# Patient Record
Sex: FEMALE | Race: ASIAN | NOT HISPANIC OR LATINO
[De-identification: names, ages, dates, MRNs, and addresses within clinical notes are randomized per-mention and may not be internally consistent; named-entity substitution may affect disease eponyms.]

---

## 2019-12-30 PROBLEM — Z00.00 ENCOUNTER FOR PREVENTIVE HEALTH EXAMINATION: Status: ACTIVE | Noted: 2019-12-30

## 2020-01-07 ENCOUNTER — APPOINTMENT (OUTPATIENT)
Dept: OBGYN | Facility: CLINIC | Age: 65
End: 2020-01-07
Payer: MEDICAID

## 2020-01-07 VITALS
HEART RATE: 70 BPM | HEIGHT: 64 IN | TEMPERATURE: 97.9 F | SYSTOLIC BLOOD PRESSURE: 133 MMHG | DIASTOLIC BLOOD PRESSURE: 70 MMHG | BODY MASS INDEX: 32.1 KG/M2 | WEIGHT: 188 LBS | OXYGEN SATURATION: 98 %

## 2020-01-07 DIAGNOSIS — Z01.419 ENCOUNTER FOR GYNECOLOGICAL EXAMINATION (GENERAL) (ROUTINE) W/OUT ABNORMAL FINDINGS: ICD-10-CM

## 2020-01-07 DIAGNOSIS — Z78.9 OTHER SPECIFIED HEALTH STATUS: ICD-10-CM

## 2020-01-07 PROCEDURE — 99386 PREV VISIT NEW AGE 40-64: CPT

## 2020-01-07 RX ORDER — GABAPENTIN 300 MG/1
300 CAPSULE ORAL
Qty: 60 | Refills: 0 | Status: ACTIVE | COMMUNITY
Start: 2019-12-04

## 2020-01-07 RX ORDER — LANCETS 33 GAUGE
EACH MISCELLANEOUS
Qty: 100 | Refills: 0 | Status: ACTIVE | COMMUNITY
Start: 2019-12-04

## 2020-01-07 RX ORDER — ATORVASTATIN CALCIUM 20 MG/1
20 TABLET, FILM COATED ORAL
Qty: 30 | Refills: 0 | Status: ACTIVE | COMMUNITY
Start: 2019-12-04

## 2020-01-07 RX ORDER — TOLTERODINE TARTRATE 2 MG/1
2 CAPSULE, EXTENDED RELEASE ORAL
Qty: 30 | Refills: 0 | Status: DISCONTINUED | COMMUNITY
Start: 2019-12-04

## 2020-01-07 RX ORDER — METFORMIN HYDROCHLORIDE 500 MG/1
500 TABLET, COATED ORAL
Qty: 60 | Refills: 0 | Status: ACTIVE | COMMUNITY
Start: 2019-12-04

## 2020-01-07 RX ORDER — ISOPROPYL ALCOHOL 0.75 G/1
SWAB TOPICAL
Qty: 100 | Refills: 0 | Status: ACTIVE | COMMUNITY
Start: 2019-12-04

## 2020-01-07 RX ORDER — OXYBUTYNIN CHLORIDE 5 MG/1
5 TABLET, EXTENDED RELEASE ORAL
Qty: 30 | Refills: 0 | Status: ACTIVE | COMMUNITY
Start: 2019-12-04

## 2020-01-07 RX ORDER — ASPIRIN 81 MG/1
81 TABLET, COATED ORAL
Qty: 30 | Refills: 0 | Status: ACTIVE | COMMUNITY
Start: 2019-12-04

## 2020-01-07 RX ORDER — LOSARTAN POTASSIUM 50 MG/1
50 TABLET, FILM COATED ORAL
Qty: 30 | Refills: 0 | Status: ACTIVE | COMMUNITY
Start: 2019-12-04

## 2020-01-07 RX ORDER — OMEPRAZOLE 20 MG/1
20 TABLET, DELAYED RELEASE ORAL
Qty: 28 | Refills: 0 | Status: ACTIVE | COMMUNITY
Start: 2019-12-04

## 2020-01-07 RX ORDER — KETOROLAC TROMETHAMINE 5 MG/ML
0.5 SOLUTION OPHTHALMIC
Qty: 5 | Refills: 0 | Status: ACTIVE | COMMUNITY
Start: 2019-12-20

## 2020-01-07 NOTE — CHIEF COMPLAINT
[Initial Visit] : initial GYN visit [FreeTextEntry1] : came c/o sudden numbness at her vulva twice&went away  1week back

## 2020-10-18 ENCOUNTER — EMERGENCY (EMERGENCY)
Facility: HOSPITAL | Age: 65
LOS: 1 days | Discharge: HOME | End: 2020-10-18
Attending: STUDENT IN AN ORGANIZED HEALTH CARE EDUCATION/TRAINING PROGRAM
Payer: MEDICAID

## 2020-10-18 VITALS
SYSTOLIC BLOOD PRESSURE: 132 MMHG | RESPIRATION RATE: 18 BRPM | TEMPERATURE: 98 F | OXYGEN SATURATION: 98 % | HEIGHT: 63 IN | WEIGHT: 179.9 LBS | HEART RATE: 74 BPM | DIASTOLIC BLOOD PRESSURE: 67 MMHG

## 2020-10-18 LAB
ACETONE SERPL-MCNC: NEGATIVE — SIGNIFICANT CHANGE UP
ALBUMIN SERPL ELPH-MCNC: 3.4 G/DL — LOW (ref 3.5–5)
ALP SERPL-CCNC: 100 U/L — SIGNIFICANT CHANGE UP (ref 40–120)
ALT FLD-CCNC: 23 U/L DA — SIGNIFICANT CHANGE UP (ref 10–60)
ANION GAP SERPL CALC-SCNC: 5 MMOL/L — SIGNIFICANT CHANGE UP (ref 5–17)
APPEARANCE UR: CLEAR — SIGNIFICANT CHANGE UP
AST SERPL-CCNC: 17 U/L — SIGNIFICANT CHANGE UP (ref 10–40)
BASOPHILS # BLD AUTO: 0.03 K/UL — SIGNIFICANT CHANGE UP (ref 0–0.2)
BASOPHILS NFR BLD AUTO: 0.2 % — SIGNIFICANT CHANGE UP (ref 0–2)
BILIRUB SERPL-MCNC: 0.3 MG/DL — SIGNIFICANT CHANGE UP (ref 0.2–1.2)
BILIRUB UR-MCNC: NEGATIVE — SIGNIFICANT CHANGE UP
BUN SERPL-MCNC: 13 MG/DL — SIGNIFICANT CHANGE UP (ref 7–18)
CALCIUM SERPL-MCNC: 8.8 MG/DL — SIGNIFICANT CHANGE UP (ref 8.4–10.5)
CHLORIDE SERPL-SCNC: 106 MMOL/L — SIGNIFICANT CHANGE UP (ref 96–108)
CO2 SERPL-SCNC: 28 MMOL/L — SIGNIFICANT CHANGE UP (ref 22–31)
COLOR SPEC: YELLOW — SIGNIFICANT CHANGE UP
CREAT SERPL-MCNC: 0.73 MG/DL — SIGNIFICANT CHANGE UP (ref 0.5–1.3)
DIFF PNL FLD: NEGATIVE — SIGNIFICANT CHANGE UP
EOSINOPHIL # BLD AUTO: 0.03 K/UL — SIGNIFICANT CHANGE UP (ref 0–0.5)
EOSINOPHIL NFR BLD AUTO: 0.2 % — SIGNIFICANT CHANGE UP (ref 0–6)
GLUCOSE SERPL-MCNC: 154 MG/DL — HIGH (ref 70–99)
GLUCOSE UR QL: NEGATIVE — SIGNIFICANT CHANGE UP
HCT VFR BLD CALC: 34.2 % — LOW (ref 34.5–45)
HGB BLD-MCNC: 10.8 G/DL — LOW (ref 11.5–15.5)
IMM GRANULOCYTES NFR BLD AUTO: 0.3 % — SIGNIFICANT CHANGE UP (ref 0–1.5)
KETONES UR-MCNC: NEGATIVE — SIGNIFICANT CHANGE UP
LEUKOCYTE ESTERASE UR-ACNC: NEGATIVE — SIGNIFICANT CHANGE UP
LYMPHOCYTES # BLD AUTO: 1.05 K/UL — SIGNIFICANT CHANGE UP (ref 1–3.3)
LYMPHOCYTES # BLD AUTO: 6.9 % — LOW (ref 13–44)
MAGNESIUM SERPL-MCNC: 1.9 MG/DL — SIGNIFICANT CHANGE UP (ref 1.6–2.6)
MCHC RBC-ENTMCNC: 26.6 PG — LOW (ref 27–34)
MCHC RBC-ENTMCNC: 31.6 GM/DL — LOW (ref 32–36)
MCV RBC AUTO: 84.2 FL — SIGNIFICANT CHANGE UP (ref 80–100)
MONOCYTES # BLD AUTO: 0.96 K/UL — HIGH (ref 0–0.9)
MONOCYTES NFR BLD AUTO: 6.3 % — SIGNIFICANT CHANGE UP (ref 2–14)
NEUTROPHILS # BLD AUTO: 13.05 K/UL — HIGH (ref 1.8–7.4)
NEUTROPHILS NFR BLD AUTO: 86.1 % — HIGH (ref 43–77)
NITRITE UR-MCNC: NEGATIVE — SIGNIFICANT CHANGE UP
NRBC # BLD: 0 /100 WBCS — SIGNIFICANT CHANGE UP (ref 0–0)
PH UR: 6 — SIGNIFICANT CHANGE UP (ref 5–8)
PLATELET # BLD AUTO: 233 K/UL — SIGNIFICANT CHANGE UP (ref 150–400)
POTASSIUM SERPL-MCNC: 4.4 MMOL/L — SIGNIFICANT CHANGE UP (ref 3.5–5.3)
POTASSIUM SERPL-SCNC: 4.4 MMOL/L — SIGNIFICANT CHANGE UP (ref 3.5–5.3)
PROT SERPL-MCNC: 7.3 G/DL — SIGNIFICANT CHANGE UP (ref 6–8.3)
PROT UR-MCNC: NEGATIVE — SIGNIFICANT CHANGE UP
RBC # BLD: 4.06 M/UL — SIGNIFICANT CHANGE UP (ref 3.8–5.2)
RBC # FLD: 15.9 % — HIGH (ref 10.3–14.5)
SODIUM SERPL-SCNC: 139 MMOL/L — SIGNIFICANT CHANGE UP (ref 135–145)
SP GR SPEC: 1.01 — SIGNIFICANT CHANGE UP (ref 1.01–1.02)
UROBILINOGEN FLD QL: NEGATIVE — SIGNIFICANT CHANGE UP
WBC # BLD: 15.16 K/UL — HIGH (ref 3.8–10.5)
WBC # FLD AUTO: 15.16 K/UL — HIGH (ref 3.8–10.5)

## 2020-10-18 PROCEDURE — 81001 URINALYSIS AUTO W/SCOPE: CPT

## 2020-10-18 PROCEDURE — 36415 COLL VENOUS BLD VENIPUNCTURE: CPT

## 2020-10-18 PROCEDURE — 83735 ASSAY OF MAGNESIUM: CPT

## 2020-10-18 PROCEDURE — 82009 KETONE BODYS QUAL: CPT

## 2020-10-18 PROCEDURE — 80053 COMPREHEN METABOLIC PANEL: CPT

## 2020-10-18 PROCEDURE — 99283 EMERGENCY DEPT VISIT LOW MDM: CPT

## 2020-10-18 PROCEDURE — 85025 COMPLETE CBC W/AUTO DIFF WBC: CPT

## 2020-10-18 PROCEDURE — 87086 URINE CULTURE/COLONY COUNT: CPT

## 2020-10-18 PROCEDURE — 83690 ASSAY OF LIPASE: CPT

## 2020-10-18 RX ORDER — SODIUM CHLORIDE 9 MG/ML
1000 INJECTION INTRAMUSCULAR; INTRAVENOUS; SUBCUTANEOUS ONCE
Refills: 0 | Status: COMPLETED | OUTPATIENT
Start: 2020-10-18 | End: 2020-10-18

## 2020-10-18 RX ADMIN — SODIUM CHLORIDE 1000 MILLILITER(S): 9 INJECTION INTRAMUSCULAR; INTRAVENOUS; SUBCUTANEOUS at 22:14

## 2020-10-18 NOTE — ED ADULT TRIAGE NOTE - CHIEF COMPLAINT QUOTE
c/o headache w/ nausea and vomiting, h/o NIDDM, HLD, GERD, pt states she had a nuclear stress test yesterday

## 2020-10-18 NOTE — ED PROVIDER NOTE - PHYSICAL EXAMINATION
General: well appearing female, no acute distress   HEENT: normocephalic, atraumatic, PERRL, EOMI  Respiratory: normal work of breathing, lungs clear to auscultation bilaterally   Cardiac: regular rate and rhythm   Abdomen: soft, non-tender, no guarding or rebound   MSK: no swelling or tenderness of lower extremities, moving all extremities spontaneously   Skin: warm, dry   Neuro: A&Ox3, cranial nerves II-XII intact, 5/5 strength in all extremities, no sensory deficits, no pronator drift, normal-finger-to-nose, normal gait   Psych: appropriate affect

## 2020-10-18 NOTE — ED PROVIDER NOTE - PATIENT PORTAL LINK FT
You can access the FollowMyHealth Patient Portal offered by [facility] by registering at the following website: [email or web address]. By joining [employer]’s FollowMyHealth portal, you will also be able to view your health information using other applications (apps) compatible with our system.

## 2020-10-18 NOTE — ED PROVIDER NOTE - CLINICAL SUMMARY MEDICAL DECISION MAKING FREE TEXT BOX
64F presenting with headache and vomiting. no current symptoms. reports have increased urination but no dysuria. nonfocal neuro exam. no trauma. no family history of aneurysms. discussed getting CT head with patient but patient strongly declines. reports she needs to be sedated for a CT scan and would prefer to have it scheduled with her pcp as an out patient. low suspicion for subarachnoid hemorrhage given presentation so will defer. plan for labs, fluids, will reassess.

## 2020-10-18 NOTE — ED PROVIDER NOTE - NSCAREINITIATED _GEN_ER
ASSUME CARE OF PT TO ER WITH C/O BACK PAIN SINCE YESTERDAY. DENIES ANY RECENT INJURY/TRAUMA. DENIES BLOOD IN URINE/STOOL. DENIES CP/SOB. BLOOD SENT TO LAB. MEDICATED PER MD ORDERS. SIDE RAILS UPX2. ER WORKUP IN PROGRESS.    Michelle Pérez(Attending)

## 2020-10-18 NOTE — ED PROVIDER NOTE - NSFOLLOWUPINSTRUCTIONS_ED_ALL_ED_FT
You were seen in the emergency department for vomiting.     Please follow-up with your primary care doctor in the next 24-48 hours.    If you have any worsening symptoms, severe abdominal pain, chest pain, nausea or vomiting, or you are unable to tolerate food or fluids, please return to the emergency department.

## 2020-10-18 NOTE — ED PROVIDER NOTE - OBJECTIVE STATEMENT
64F, pmh of DM (on metformin), htn, presenting with vomiting. patietn reports gradual headache today that felt like her typical headache. around 7pm the patient had sudden onset of vomiting 64F, pmh of DM (on metformin), htn, presenting with vomiting. patient reports gradual headache today that felt like her typical headache. around 7pm the patient had sudden onset of vomiting and had 5 back to back episodes of vomiting. patient felt weak, dizzy and had full body numbness during the episode. headache improved after the vomiting. no current symptoms. no fever, chest pain, shortness of breath, abdominal pain, pain or burning with urination, pain or swelling of lower extremities.

## 2020-10-19 VITALS
DIASTOLIC BLOOD PRESSURE: 70 MMHG | TEMPERATURE: 98 F | OXYGEN SATURATION: 98 % | HEART RATE: 72 BPM | RESPIRATION RATE: 18 BRPM | SYSTOLIC BLOOD PRESSURE: 130 MMHG

## 2020-10-19 NOTE — ED ADULT NURSE NOTE - OBJECTIVE STATEMENT
Pt stated she went to urgent care center because she was vomiting a lot and they told her to come to the ER because they think she might have a virus and they don't do blood work there. pt stated she had stress test done today

## 2020-10-20 LAB
CULTURE RESULTS: SIGNIFICANT CHANGE UP
SPECIMEN SOURCE: SIGNIFICANT CHANGE UP

## 2020-10-26 DIAGNOSIS — Z01.818 ENCOUNTER FOR OTHER PREPROCEDURAL EXAMINATION: ICD-10-CM

## 2020-10-26 PROBLEM — I10 ESSENTIAL (PRIMARY) HYPERTENSION: Chronic | Status: ACTIVE | Noted: 2020-10-18

## 2020-10-26 PROBLEM — E11.9 TYPE 2 DIABETES MELLITUS WITHOUT COMPLICATIONS: Chronic | Status: ACTIVE | Noted: 2020-10-18

## 2020-10-27 ENCOUNTER — APPOINTMENT (OUTPATIENT)
Dept: DISASTER EMERGENCY | Facility: CLINIC | Age: 65
End: 2020-10-27

## 2020-10-27 VITALS
HEIGHT: 63 IN | RESPIRATION RATE: 16 BRPM | SYSTOLIC BLOOD PRESSURE: 129 MMHG | WEIGHT: 177.91 LBS | HEART RATE: 99 BPM | TEMPERATURE: 98 F | DIASTOLIC BLOOD PRESSURE: 60 MMHG | OXYGEN SATURATION: 99 %

## 2020-10-27 NOTE — H&P ADULT - HISTORY OF PRESENT ILLNESS
COVID:  Pharmacy:  Escort:    ***[facility]***    65 y/o F with PMHx significant for HTN, HLD, DM-II, who presented to Dr. Odom with c/o chest pain and SOB on exertion..............................  Pt also c/o dizziness..........   Denies any n/v, diaphoresis, orthopnea, PND, palpitations, syncope, LE edema.  Subsequently, she underwent a NST on 10/17/20 which revealed inferior perfusion defect, EF 70%.  ECHO 10/3/20: EF 66%, mild MR and TR, MVP (subacute bacterial endocarditis prophylaxis is not required).  Carotid US 10/3/20: 10-29% stenosis b/l ICAs.    In light of pt's risk factors, CCS class _______ anginal symptoms, and recent abnormal NST, she is referred for cardiac cath with possible intervention for suspected CAD. COVID: 10/27 @ [facility]  Pharmacy: [facility]  Escort: Son  Cardiologist Dr. Odom    *Please confirm pt took pre-medication of prednisone prior to arrival for severe contrast allergy* (all other meds confirmed)    63 y/o F, SEVERE CONTRAST ALLERGY with PMHx significant for HTN, HLD, DM-II, GERD who presented to Dr. Odom for routine follow up. Pt denies any symptoms at that time. Denies CP, SOB, dizziness, diaphoresis, palpitations, orthopnea/PND, abdominal pain, N/V/D, urinary sx, BRBPR, hematuria, melena, LE swelling, recent travel/sick contacts/illness. Subsequently, she underwent a NST on 10/17/20 which revealed inferior perfusion defect, EF 70%.  ECHO 10/3/20: EF 66%, mild MR and TR, MVP (subacute bacterial endocarditis prophylaxis is not required).  Carotid US 10/3/20: 10-29% stenosis b/l ICAs.    In light of pt's risk factors and recent abnormal NST, she is referred for cardiac cath with possible intervention for suspected CAD. COVID: 10/27 negative in [facility]  Pharmacy: [facility]  Escort: Son  Cardiologist Dr. Odom    63 y/o F, SEVERE CONTRAST ALLERGY with PMHx significant for HTN, HLD, DM-II, GERD who presented to Dr. Odom for routine follow up. Pt denies any symptoms at that time. Denies CP, SOB, dizziness, diaphoresis, palpitations, orthopnea/PND, abdominal pain, N/V/D, urinary sx, BRBPR, hematuria, melena, LE swelling, recent travel/sick contacts/illness. Subsequently, she underwent a NST on 10/17/20 which revealed inferior perfusion defect, EF 70%.  ECHO 10/3/20: EF 66%, mild MR and TR, MVP (subacute bacterial endocarditis prophylaxis is not required).  Carotid US 10/3/20: 10-29% stenosis b/l ICAs.    In light of pt's risk factors and recent abnormal NST, she is referred for cardiac cath with possible intervention for suspected CAD.

## 2020-10-27 NOTE — H&P ADULT - NSICDXPASTMEDICALHX_GEN_ALL_CORE_FT
PAST MEDICAL HISTORY:  DM (diabetes mellitus)     HLD (hyperlipidemia)     HTN (hypertension)      PAST MEDICAL HISTORY:  Chronic GERD     DM (diabetes mellitus)     HLD (hyperlipidemia)     HTN (hypertension)

## 2020-10-27 NOTE — H&P ADULT - NSHPLABSRESULTS_GEN_ALL_CORE
10.7   10.29 )-----------( 263      ( 29 Oct 2020 09:38 )             35.1       PT/INR - ( 29 Oct 2020 09:38 )   PT: 11.9 sec;   INR: 0.99          PTT - ( 29 Oct 2020 09:38 )  PTT:30.6 sec      EKG: NSR with TWI in III 10.7   10.29 )-----------( 263      ( 29 Oct 2020 09:38 )             35.1       10-29    140  |  102  |  21  ----------------------------<  175<H>  4.4   |  22  |  0.56    Ca    9.8      29 Oct 2020 09:38    TPro  7.8  /  Alb  4.5  /  TBili  0.2  /  DBili  x   /  AST  19  /  ALT  19  /  AlkPhos  98  10-29      PT/INR - ( 29 Oct 2020 09:38 )   PT: 11.9 sec;   INR: 0.99          PTT - ( 29 Oct 2020 09:38 )  PTT:30.6 sec    CARDIAC MARKERS ( 29 Oct 2020 09:38 )  x     / x     / 198 U/L / x     / 3.2 ng/mL      EKG: NSR with TWI in III

## 2020-10-27 NOTE — H&P ADULT - ASSESSMENT
65 y/o F, SEVERE CONTRAST ALLERGY with PMHx significant for HTN, HLD, DM-II, GERD who presents to [facility] for recommended cardiac catheterization with possible intervention if clinically indicated, in light of pts risk factors, CCS class II anginal symptoms and abnormal NST.    -ASA 3, Mallampati 3  -Pt intermittently takes ASA at home. Will load pt with ASA 325mg and Plavix 600mg prior to cath  -IVF Hydrations NS @ 75cc/hr  -Pt now s/p prednisone 50mg x 3 (6pm, 12am, 6am) for severe contrast allergy. Will give Solucortef 200mg IVP x 1 and Benadryl 50mg IVP x 1 on call to cath lab table  -pre cath consented    Risks & benefits of procedure and alternative therapy have been explained to the patient including but not limited to: allergic reaction, bleeding w/possible need for blood transfusion, infection, renal and vascular compromise, limb damage, arrhythmia, stroke, vessel dissection/perforation, Myocardial infarction, emergent CABG. Informed consent obtained and in chart.

## 2020-10-29 ENCOUNTER — OUTPATIENT (OUTPATIENT)
Dept: OUTPATIENT SERVICES | Facility: HOSPITAL | Age: 65
LOS: 1 days | Discharge: HOME | End: 2020-10-29
Payer: COMMERCIAL

## 2020-10-29 DIAGNOSIS — I20.9 ANGINA PECTORIS, UNSPECIFIED: ICD-10-CM

## 2020-10-29 DIAGNOSIS — Z90.710 ACQUIRED ABSENCE OF BOTH CERVIX AND UTERUS: Chronic | ICD-10-CM

## 2020-10-29 DIAGNOSIS — Z82.49 FAMILY HISTORY OF ISCHEMIC HEART DISEASE AND OTHER DISEASES OF THE CIRCULATORY SYSTEM: ICD-10-CM

## 2020-10-29 DIAGNOSIS — E78.5 HYPERLIPIDEMIA, UNSPECIFIED: ICD-10-CM

## 2020-10-29 DIAGNOSIS — R94.39 ABNORMAL RESULT OF OTHER CARDIOVASCULAR FUNCTION STUDY: ICD-10-CM

## 2020-10-29 DIAGNOSIS — I10 ESSENTIAL (PRIMARY) HYPERTENSION: ICD-10-CM

## 2020-10-29 DIAGNOSIS — E11.9 TYPE 2 DIABETES MELLITUS WITHOUT COMPLICATIONS: ICD-10-CM

## 2020-10-29 LAB
A1C WITH ESTIMATED AVERAGE GLUCOSE RESULT: 6.5 % — HIGH (ref 4–5.6)
ALBUMIN SERPL ELPH-MCNC: 4.5 G/DL — SIGNIFICANT CHANGE UP (ref 3.3–5)
ALP SERPL-CCNC: 98 U/L — SIGNIFICANT CHANGE UP (ref 40–120)
ALT FLD-CCNC: 19 U/L — SIGNIFICANT CHANGE UP (ref 10–45)
ANION GAP SERPL CALC-SCNC: 16 MMOL/L — SIGNIFICANT CHANGE UP (ref 5–17)
APTT BLD: 30.6 SEC — SIGNIFICANT CHANGE UP (ref 27.5–35.5)
AST SERPL-CCNC: 19 U/L — SIGNIFICANT CHANGE UP (ref 10–40)
BASOPHILS # BLD AUTO: 0.01 K/UL — SIGNIFICANT CHANGE UP (ref 0–0.2)
BASOPHILS NFR BLD AUTO: 0.1 % — SIGNIFICANT CHANGE UP (ref 0–2)
BILIRUB SERPL-MCNC: 0.2 MG/DL — SIGNIFICANT CHANGE UP (ref 0.2–1.2)
BUN SERPL-MCNC: 21 MG/DL — SIGNIFICANT CHANGE UP (ref 7–23)
CALCIUM SERPL-MCNC: 9.8 MG/DL — SIGNIFICANT CHANGE UP (ref 8.4–10.5)
CHLORIDE SERPL-SCNC: 102 MMOL/L — SIGNIFICANT CHANGE UP (ref 96–108)
CHOLEST SERPL-MCNC: 179 MG/DL — SIGNIFICANT CHANGE UP
CK MB CFR SERPL CALC: 3.2 NG/ML — SIGNIFICANT CHANGE UP (ref 0–6.7)
CK SERPL-CCNC: 198 U/L — HIGH (ref 25–170)
CO2 SERPL-SCNC: 22 MMOL/L — SIGNIFICANT CHANGE UP (ref 22–31)
CREAT SERPL-MCNC: 0.56 MG/DL — SIGNIFICANT CHANGE UP (ref 0.5–1.3)
CRP SERPL-MCNC: 0.13 MG/DL — SIGNIFICANT CHANGE UP (ref 0–0.4)
EOSINOPHIL # BLD AUTO: 0 K/UL — SIGNIFICANT CHANGE UP (ref 0–0.5)
EOSINOPHIL NFR BLD AUTO: 0 % — SIGNIFICANT CHANGE UP (ref 0–6)
ESTIMATED AVERAGE GLUCOSE: 140 MG/DL — HIGH (ref 68–114)
GLUCOSE SERPL-MCNC: 175 MG/DL — HIGH (ref 70–99)
HCT VFR BLD CALC: 35.1 % — SIGNIFICANT CHANGE UP (ref 34.5–45)
HDLC SERPL-MCNC: 60 MG/DL — SIGNIFICANT CHANGE UP
HGB BLD-MCNC: 10.7 G/DL — LOW (ref 11.5–15.5)
IMM GRANULOCYTES NFR BLD AUTO: 1.1 % — SIGNIFICANT CHANGE UP (ref 0–1.5)
INR BLD: 0.99 — SIGNIFICANT CHANGE UP (ref 0.88–1.16)
LIPID PNL WITH DIRECT LDL SERPL: 106 MG/DL — HIGH
LYMPHOCYTES # BLD AUTO: 1.13 K/UL — SIGNIFICANT CHANGE UP (ref 1–3.3)
LYMPHOCYTES # BLD AUTO: 11 % — LOW (ref 13–44)
MCHC RBC-ENTMCNC: 26 PG — LOW (ref 27–34)
MCHC RBC-ENTMCNC: 30.5 GM/DL — LOW (ref 32–36)
MCV RBC AUTO: 85.4 FL — SIGNIFICANT CHANGE UP (ref 80–100)
MONOCYTES # BLD AUTO: 0.08 K/UL — SIGNIFICANT CHANGE UP (ref 0–0.9)
MONOCYTES NFR BLD AUTO: 0.8 % — LOW (ref 2–14)
NEUTROPHILS # BLD AUTO: 8.96 K/UL — HIGH (ref 1.8–7.4)
NEUTROPHILS NFR BLD AUTO: 87 % — HIGH (ref 43–77)
NON HDL CHOLESTEROL: 119 MG/DL — SIGNIFICANT CHANGE UP
NRBC # BLD: 0 /100 WBCS — SIGNIFICANT CHANGE UP (ref 0–0)
PLATELET # BLD AUTO: 263 K/UL — SIGNIFICANT CHANGE UP (ref 150–400)
POTASSIUM SERPL-MCNC: 4.4 MMOL/L — SIGNIFICANT CHANGE UP (ref 3.5–5.3)
POTASSIUM SERPL-SCNC: 4.4 MMOL/L — SIGNIFICANT CHANGE UP (ref 3.5–5.3)
PROT SERPL-MCNC: 7.8 G/DL — SIGNIFICANT CHANGE UP (ref 6–8.3)
PROTHROM AB SERPL-ACNC: 11.9 SEC — SIGNIFICANT CHANGE UP (ref 10.6–13.6)
RBC # BLD: 4.11 M/UL — SIGNIFICANT CHANGE UP (ref 3.8–5.2)
RBC # FLD: 15.8 % — HIGH (ref 10.3–14.5)
SARS-COV-2 N GENE NPH QL NAA+PROBE: NOT DETECTED
SODIUM SERPL-SCNC: 140 MMOL/L — SIGNIFICANT CHANGE UP (ref 135–145)
TRIGL SERPL-MCNC: 66 MG/DL — SIGNIFICANT CHANGE UP
WBC # BLD: 10.29 K/UL — SIGNIFICANT CHANGE UP (ref 3.8–10.5)
WBC # FLD AUTO: 10.29 K/UL — SIGNIFICANT CHANGE UP (ref 3.8–10.5)

## 2020-10-29 PROCEDURE — C1887: CPT

## 2020-10-29 PROCEDURE — 83036 HEMOGLOBIN GLYCOSYLATED A1C: CPT

## 2020-10-29 PROCEDURE — 85025 COMPLETE CBC W/AUTO DIFF WBC: CPT

## 2020-10-29 PROCEDURE — 80061 LIPID PANEL: CPT

## 2020-10-29 PROCEDURE — 93010 ELECTROCARDIOGRAM REPORT: CPT

## 2020-10-29 PROCEDURE — C1769: CPT

## 2020-10-29 PROCEDURE — 80053 COMPREHEN METABOLIC PANEL: CPT

## 2020-10-29 PROCEDURE — 82962 GLUCOSE BLOOD TEST: CPT

## 2020-10-29 PROCEDURE — 85610 PROTHROMBIN TIME: CPT

## 2020-10-29 PROCEDURE — 82550 ASSAY OF CK (CPK): CPT

## 2020-10-29 PROCEDURE — 85730 THROMBOPLASTIN TIME PARTIAL: CPT

## 2020-10-29 PROCEDURE — C1894: CPT

## 2020-10-29 PROCEDURE — 93005 ELECTROCARDIOGRAM TRACING: CPT

## 2020-10-29 PROCEDURE — 82553 CREATINE MB FRACTION: CPT

## 2020-10-29 PROCEDURE — 93454 CORONARY ARTERY ANGIO S&I: CPT | Mod: 26,59

## 2020-10-29 PROCEDURE — 86140 C-REACTIVE PROTEIN: CPT

## 2020-10-29 PROCEDURE — 93454 CORONARY ARTERY ANGIO S&I: CPT

## 2020-10-29 RX ORDER — CHLORHEXIDINE GLUCONATE 213 G/1000ML
1 SOLUTION TOPICAL ONCE
Refills: 0 | Status: DISCONTINUED | OUTPATIENT
Start: 2020-10-29 | End: 2020-10-29

## 2020-10-29 RX ORDER — DIPHENHYDRAMINE HCL 50 MG
50 CAPSULE ORAL ONCE
Refills: 0 | Status: DISCONTINUED | OUTPATIENT
Start: 2020-10-29 | End: 2020-11-12

## 2020-10-29 RX ORDER — ATORVASTATIN CALCIUM 80 MG/1
1 TABLET, FILM COATED ORAL
Qty: 0 | Refills: 0 | DISCHARGE

## 2020-10-29 RX ORDER — ASPIRIN/CALCIUM CARB/MAGNESIUM 324 MG
325 TABLET ORAL ONCE
Refills: 0 | Status: COMPLETED | OUTPATIENT
Start: 2020-10-29 | End: 2020-10-29

## 2020-10-29 RX ORDER — ASPIRIN/CALCIUM CARB/MAGNESIUM 324 MG
1 TABLET ORAL
Qty: 0 | Refills: 0 | DISCHARGE

## 2020-10-29 RX ORDER — OMEPRAZOLE 10 MG/1
1 CAPSULE, DELAYED RELEASE ORAL
Qty: 0 | Refills: 0 | DISCHARGE

## 2020-10-29 RX ORDER — AMLODIPINE BESYLATE 2.5 MG/1
1 TABLET ORAL
Qty: 0 | Refills: 0 | DISCHARGE

## 2020-10-29 RX ORDER — SODIUM CHLORIDE 9 MG/ML
500 INJECTION INTRAMUSCULAR; INTRAVENOUS; SUBCUTANEOUS
Refills: 0 | Status: DISCONTINUED | OUTPATIENT
Start: 2020-10-29 | End: 2020-10-29

## 2020-10-29 RX ORDER — SODIUM CHLORIDE 9 MG/ML
500 INJECTION INTRAMUSCULAR; INTRAVENOUS; SUBCUTANEOUS
Refills: 0 | Status: DISCONTINUED | OUTPATIENT
Start: 2020-10-29 | End: 2020-11-12

## 2020-10-29 RX ORDER — HYDROCORTISONE 20 MG
200 TABLET ORAL ONCE
Refills: 0 | Status: COMPLETED | OUTPATIENT
Start: 2020-10-29 | End: 2020-10-29

## 2020-10-29 RX ORDER — CLOPIDOGREL BISULFATE 75 MG/1
600 TABLET, FILM COATED ORAL ONCE
Refills: 0 | Status: COMPLETED | OUTPATIENT
Start: 2020-10-29 | End: 2020-10-29

## 2020-10-29 RX ORDER — LOSARTAN POTASSIUM 100 MG/1
1 TABLET, FILM COATED ORAL
Qty: 0 | Refills: 0 | DISCHARGE

## 2020-10-29 RX ORDER — METFORMIN HYDROCHLORIDE 850 MG/1
1 TABLET ORAL
Qty: 0 | Refills: 0 | DISCHARGE

## 2020-10-29 RX ADMIN — SODIUM CHLORIDE 75 MILLILITER(S): 9 INJECTION INTRAMUSCULAR; INTRAVENOUS; SUBCUTANEOUS at 10:39

## 2020-10-29 RX ADMIN — Medication 325 MILLIGRAM(S): at 10:39

## 2020-10-29 RX ADMIN — CLOPIDOGREL BISULFATE 600 MILLIGRAM(S): 75 TABLET, FILM COATED ORAL at 10:38

## 2020-10-29 RX ADMIN — Medication 200 MILLIGRAM(S): at 10:39

## 2020-10-29 NOTE — PROGRESS NOTE ADULT - SUBJECTIVE AND OBJECTIVE BOX
Interventional Cardiology PA SDA Discharge Note    Patient without complaints. Ambulated and voided without difficulties    Afebrile, VSS    Ext: Right Radial:  no hematoma, no bleeding, dressing; C/D/I  Pulses:    intact RAD/DP/PT back to baseline     A/P:  63 y/o F, SEVERE CONTRAST ALLERGY with PMHx significant for HTN, HLD, DM-II, GERD who presented to Dr. Odom for routine follow up. Pt denies any symptoms at that time. Denies CP, SOB, dizziness, diaphoresis, palpitations, orthopnea/PND, abdominal pain, N/V/D, urinary sx, BRBPR, hematuria, melena, LE swelling, recent travel/sick contacts/illness. Subsequently, she underwent a NST on 10/17/20 which revealed inferior perfusion defect, EF 70%.  ECHO 10/3/20: EF 66%, mild MR and TR, MVP (subacute bacterial endocarditis prophylaxis is not required).  Carotid US 10/3/20: 10-29% stenosis b/l ICAs.    In light of pt's risk factors and recent abnormal NST, she is referred for cardiac cath with possible intervention for suspected CAD.    Pt is s/p Diagnostic Cath 10/29:      1.	Stable for discharge as per attending Dr. Hammond after bed rest, pt voids, groin/wrist stable and 30 minutes of ambulation.  2.	Follow-up with Cardiologist, Dr. Shaik Odom in 1-2 weeks  3.	Discharged forms signed and copies in chart      Interventional Cardiology PA SDA Discharge Note    Patient without complaints. Ambulated and voided without difficulties    Afebrile, VSS    Ext: Right Radial:  no hematoma, no bleeding, dressing; C/D/I  Pulses:    intact RAD/DP/PT back to baseline     A/P:  63 y/o F, SEVERE CONTRAST ALLERGY with PMHx significant for HTN, HLD, DM-II, GERD who presented to Dr. Odom for routine follow up. Pt denies any symptoms at that time. Denies CP, SOB, dizziness, diaphoresis, palpitations, orthopnea/PND, abdominal pain, N/V/D, urinary sx, BRBPR, hematuria, melena, LE swelling, recent travel/sick contacts/illness. Subsequently, she underwent a NST on 10/17/20 which revealed inferior perfusion defect, EF 70%.  ECHO 10/3/20: EF 66%, mild MR and TR, MVP (subacute bacterial endocarditis prophylaxis is not required).  Carotid US 10/3/20: 10-29% stenosis b/l ICAs.    In light of pt's risk factors and recent abnormal NST, she is referred for cardiac cath with possible intervention for suspected CAD.    Pt is s/p Diagnostic Cath 10/29:  No significant CAD.  LM (normal), LAD/DI (minor irregularities), LCx/OM1(mild irregularities);  RCA (normal, large dominant vessel).  Continue current medical regimen    1.	Stable for discharge as per attending Dr. Hammond after bed rest, pt voids, groin/wrist stable and 30 minutes of ambulation.  2.	Follow-up with Cardiologist, Dr. Shaik Odom in 1-2 weeks  3.	Discharged forms signed and copies in chart

## 2022-10-14 PROBLEM — K21.9 GASTRO-ESOPHAGEAL REFLUX DISEASE WITHOUT ESOPHAGITIS: Chronic | Status: ACTIVE | Noted: 2020-10-28

## 2022-10-14 PROBLEM — E78.5 HYPERLIPIDEMIA, UNSPECIFIED: Chronic | Status: ACTIVE | Noted: 2020-10-27

## 2022-10-19 ENCOUNTER — APPOINTMENT (OUTPATIENT)
Dept: ORTHOPEDIC SURGERY | Facility: CLINIC | Age: 67
End: 2022-10-19

## 2022-10-19 PROCEDURE — 73564 X-RAY EXAM KNEE 4 OR MORE: CPT | Mod: RT

## 2022-10-19 PROCEDURE — 20610 DRAIN/INJ JOINT/BURSA W/O US: CPT | Mod: 50

## 2022-10-19 PROCEDURE — 99204 OFFICE O/P NEW MOD 45 MIN: CPT | Mod: 25

## 2022-10-19 NOTE — IMAGING
[de-identified] : Constitutional: well developed and well nourished, able to communicate\par Cardiovascular: Peripheral vascular exam is grossly normal\par Neurologic: Alert and oriented, no acute distress.\par Skin: normal skin with no ulcers, rashes, or lesions\par Pulmonary: No respiratory distress, breathing comfortably on room air\par Lymphatics: No obvious lymphadenopathy or lymphedema in areas examined\par \par Bilateral KNEE EXAM\par Alignment: varus\par Effusion: None\par Atrophy: None                                                 \par Stable to Varus/valgus stress\par Posterior Drawer Test: negative\par Anterior Drawer Test: Negative\par Knee Extension/Flexion: 0 / 120\par \par Medial/lateral compartments\par Medial joint line: No Tenderness\par Lateral joint line: No Tenderness\par Ken test: negative\par \par Patellofemoral joint\par Medial patellar facet: no tenderness\par Patellar grind: POS\par \par Tendons:\par Pes Anserine: No tenderness\par Gerdy’s Tubercle/ IT Band: No tenderness\par Quadriceps Tendon: No Tenderness\par patellar tendon: no Tenderness\par Tibial tubercle: not tenderness\par Calf: no Tenderness\par \par Neurovascular exam\par Muscle strength: 5/5\par Sensation to light touch: intact\par Distal pulses: 2+\par \par IMAGING:\par 10/19/2022 Xrays of the Right Knee were taken demonstrating bone spurring tricompartmental and severe PF OA

## 2022-10-19 NOTE — ASSESSMENT
[FreeTextEntry1] : 66 year F with bilateral knee OA with PF OA Bone on bone \par - CSI of bilateral knees \par - 6 week follow up

## 2022-10-19 NOTE — HISTORY OF PRESENT ILLNESS
[8] : 8 [7] : 7 [Dull/Aching] : dull/aching [Constant] : constant [Standing] : standing [Walking] : walking [Stairs] : stairs [de-identified] : 10/19/2022; SYED 66 year old F here for bilateral knee pain, onset for pain for a few years, pt fell a few years ago on both knees up some stairs, last saw a podiatrist , no much was done, no past CSI, she does take Tylenol PRN , no relief , along with a heating pad, slight relief,  + tylenol . R>L [] : no [FreeTextEntry1] : Bilateral Knees  [de-identified] : a few years ago  [de-identified] : Podiatrist

## 2022-10-19 NOTE — PROCEDURE
[FreeTextEntry3] : The risks, benefits and alternatives to the injection were discussed with the patient. The decision was made to proceed with the injection to reduce inflammation within the area. Verbal consent was obtained for the procedure. The bilateral knee was cleaned with alcohol and ethyl chloride was sprayed topically. 1cc of 40mg Kenalog and 4cc of 1% lidocaine was injected. The patient tolerated the procedure well and was instructed to ice the affected joint as needed later today. Activities can be performed as tolerated\par

## 2023-01-18 ENCOUNTER — APPOINTMENT (OUTPATIENT)
Dept: ORTHOPEDIC SURGERY | Facility: CLINIC | Age: 68
End: 2023-01-18
Payer: MEDICARE

## 2023-01-18 PROCEDURE — 20610 DRAIN/INJ JOINT/BURSA W/O US: CPT | Mod: 50

## 2023-01-18 PROCEDURE — 99213 OFFICE O/P EST LOW 20 MIN: CPT | Mod: 25

## 2023-01-18 NOTE — IMAGING
[de-identified] : Constitutional: well developed and well nourished, able to communicate\par Cardiovascular: Peripheral vascular exam is grossly normal\par Neurologic: Alert and oriented, no acute distress.\par Skin: normal skin with no ulcers, rashes, or lesions\par Pulmonary: No respiratory distress, breathing comfortably on room air\par Lymphatics: No obvious lymphadenopathy or lymphedema in areas examined\par \par Bilateral KNEE EXAM\par Alignment: varus\par Effusion: None\par Atrophy: None                                                 \par Stable to Varus/valgus stress\par Posterior Drawer Test: negative\par Anterior Drawer Test: Negative\par Knee Extension/Flexion: 0 / 120\par \par Medial/lateral compartments\par Medial joint line: No Tenderness\par Lateral joint line: No Tenderness\par Ken test: negative\par \par Patellofemoral joint\par Medial patellar facet: no tenderness\par Patellar grind: POS\par \par Tendons:\par Pes Anserine: No tenderness\par Gerdy’s Tubercle/ IT Band: No tenderness\par Quadriceps Tendon: No Tenderness\par patellar tendon: no Tenderness\par Tibial tubercle: not tenderness\par Calf: no Tenderness\par \par Neurovascular exam\par Muscle strength: 5/5\par Sensation to light touch: intact\par Distal pulses: 2+\par \par IMAGING:\par 10/19/2022 Xrays of the Right Knee were taken demonstrating bone spurring tricompartmental and severe PF OA

## 2023-01-18 NOTE — HISTORY OF PRESENT ILLNESS
[8] : 8 [7] : 7 [Dull/Aching] : dull/aching [Constant] : constant [Standing] : standing [Walking] : walking [Stairs] : stairs [de-identified] : 10/19/2022; SYED 66 year old F here for bilateral knee pain, onset for pain for a few years, pt fell a few years ago on both knees up some stairs, last saw a podiatrist , no much was done, no past CSI, she does take Tylenol PRN , no relief , along with a heating pad, slight relief,  + tylenol . R>L\par \par 01/18/2023 SYED 67 year old F here for Bilateral knee follow up, pt reports improvement with last CSI since last visit. States no pain since CSI.  [] : no [FreeTextEntry1] : Bilateral Knees  [de-identified] : a few years ago  [de-identified] : Podiatrist

## 2023-01-18 NOTE — ASSESSMENT
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 Will defer CSI. Follow up PRN for further inj as needed

## 2023-06-28 ENCOUNTER — APPOINTMENT (OUTPATIENT)
Dept: ORTHOPEDIC SURGERY | Facility: CLINIC | Age: 68
End: 2023-06-28
Payer: MEDICARE

## 2023-06-28 VITALS — HEIGHT: 64 IN | BODY MASS INDEX: 32.1 KG/M2 | WEIGHT: 188 LBS

## 2023-06-28 DIAGNOSIS — M17.11 UNILATERAL PRIMARY OSTEOARTHRITIS, RIGHT KNEE: ICD-10-CM

## 2023-06-28 DIAGNOSIS — M17.12 UNILATERAL PRIMARY OSTEOARTHRITIS, LEFT KNEE: ICD-10-CM

## 2023-06-28 PROCEDURE — 99213 OFFICE O/P EST LOW 20 MIN: CPT | Mod: 25

## 2023-06-28 PROCEDURE — 20610 DRAIN/INJ JOINT/BURSA W/O US: CPT | Mod: 50

## 2023-06-28 NOTE — HISTORY OF PRESENT ILLNESS
[8] : 8 [7] : 7 [Dull/Aching] : dull/aching [Constant] : constant [Standing] : standing [Walking] : walking [Stairs] : stairs [de-identified] : 10/19/2022; SYED 66 year old F here for bilateral knee pain, onset for pain for a few years, pt fell a few years ago on both knees up some stairs, last saw a podiatrist , no much was done, no past CSI, she does take Tylenol PRN , no relief , along with a heating pad, slight relief,  + tylenol . R>L\par \par 01/18/2023 SYED 67 year old F here for Bilateral knee follow up, pt reports improvement with last CSI since last visit. States no pain since CSI. \par \par 06/28/2023 follow up Thomas knee. Reports improvement with CSI previously, has now worn off.  [] : no [FreeTextEntry1] : Bilateral Knees  [de-identified] : a few years ago  [de-identified] : Podiatrist

## 2023-06-28 NOTE — IMAGING
[de-identified] : Constitutional: well developed and well nourished, able to communicate\par Cardiovascular: Peripheral vascular exam is grossly normal\par Neurologic: Alert and oriented, no acute distress.\par Skin: normal skin with no ulcers, rashes, or lesions\par Pulmonary: No respiratory distress, breathing comfortably on room air\par Lymphatics: No obvious lymphadenopathy or lymphedema in areas examined\par \par Bilateral KNEE EXAM\par Alignment: varus\par Effusion: None\par Atrophy: None                                                 \par Stable to Varus/valgus stress\par Posterior Drawer Test: negative\par Anterior Drawer Test: Negative\par Knee Extension/Flexion: 0 / 120\par \par Medial/lateral compartments\par Medial joint line: No Tenderness\par Lateral joint line: No Tenderness\par Ken test: negative\par \par Patellofemoral joint\par Medial patellar facet: no tenderness\par Patellar grind: POS\par \par Tendons:\par Pes Anserine: No tenderness\par Gerdy’s Tubercle/ IT Band: No tenderness\par Quadriceps Tendon: No Tenderness\par patellar tendon: no Tenderness\par Tibial tubercle: not tenderness\par Calf: no Tenderness\par \par Neurovascular exam\par Muscle strength: 5/5\par Sensation to light touch: intact\par Distal pulses: 2+\par \par IMAGING:\par 10/19/2022 Xrays of the Right Knee were taken demonstrating bone spurring tricompartmental and severe PF OA

## 2023-06-28 NOTE — ASSESSMENT
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 Will defer CSI. Follow up PRN for further inj as needed\par \par 6/28/23: Bilateral CSI, follow up PRN

## 2024-03-13 NOTE — ED ADULT NURSE NOTE - ISOLATION TYPE:
In an effort to ensure that our patients [employer], a Team Member has reviewed your chart and identified an opportunity to provide the best care possible. An attempt was made to discuss or schedule overdue Preventive or Disease Management screening.     The Outcome was Contact was made, appointment scheduled. Care Gaps include Immunizations and Wellness Visits.    
None

## 2024-03-25 ENCOUNTER — APPOINTMENT (OUTPATIENT)
Dept: ORTHOPEDIC SURGERY | Facility: CLINIC | Age: 69
End: 2024-03-25
Payer: MEDICARE

## 2024-03-25 DIAGNOSIS — S92.351S DISPLACED FRACTURE OF FIFTH METATARSAL BONE, RIGHT FOOT, SEQUELA: ICD-10-CM

## 2024-03-25 DIAGNOSIS — S92.353A DISPLACED FRACTURE OF FIFTH METATARSAL BONE, UNSPECIFIED FOOT, INITIAL ENCOUNTER FOR CLOSED FRACTURE: ICD-10-CM

## 2024-03-25 PROCEDURE — 99214 OFFICE O/P EST MOD 30 MIN: CPT | Mod: 25

## 2024-03-25 PROCEDURE — 20610 DRAIN/INJ JOINT/BURSA W/O US: CPT | Mod: LT

## 2024-03-25 NOTE — PROCEDURE
[FreeTextEntry3] : The risks, benefits and alternatives to the injection were discussed with the patient. The decision was made to proceed with the injection to reduce inflammation within the area. Verbal consent was obtained for the procedure. The bilateral knee was cleaned with alcohol and ethyl chloride was sprayed topically. 1cc of 40mg Kenalog and 4cc of 1% lidocaine was injected. The patient tolerated the procedure well and was instructed to ice the affected joint as needed later today. Activities can be performed as tolerated

## 2024-03-25 NOTE — ASSESSMENT
[FreeTextEntry1] : 66 year F with bilateral knee OA with PF OA Bone on bone  - CSI of bilateral knees  - 6 week follow up  1/18.23: No pain at this time. Will defer CSI. Follow up PRN for further inj as needed  6/28/23: Bilateral CSI, follow up PRN CT of the right foot for pseudojones fracture, sustained in Dec fu with Garrofolo afterwards

## 2024-03-25 NOTE — IMAGING
[de-identified] : Constitutional: well developed and well nourished, able to communicate Cardiovascular: Peripheral vascular exam is grossly normal Neurologic: Alert and oriented, no acute distress. Skin: normal skin with no ulcers, rashes, or lesions Pulmonary: No respiratory distress, breathing comfortably on room air Lymphatics: No obvious lymphadenopathy or lymphedema in areas examined  Bilateral KNEE EXAM Alignment: varus Effusion: None Atrophy: None                                                  Stable to Varus/valgus stress Posterior Drawer Test: negative Anterior Drawer Test: Negative Knee Extension/Flexion: 0 / 120  Medial/lateral compartments Medial joint line: No Tenderness Lateral joint line: No Tenderness Ken test: negative  Patellofemoral joint Medial patellar facet: no tenderness Patellar grind: POS  Tendons: Pes Anserine: No tenderness Gerdy's Tubercle/ IT Band: No tenderness Quadriceps Tendon: No Tenderness patellar tendon: no Tenderness Tibial tubercle: not tenderness Calf: no Tenderness  Neurovascular exam Muscle strength: 5/5 Sensation to light touch: intact Distal pulses: 2+  IMAGING: 10/19/2022 Xrays of the Right Knee were taken demonstrating bone spurring tricompartmental and severe PF OA Right foot base of 5th metatarsal fx w/o signs of callus formation

## 2024-03-25 NOTE — HISTORY OF PRESENT ILLNESS
[de-identified] : 10/19/2022; SYED 66 year old F here for bilateral knee pain, onset for pain for a few years, pt fell a few years ago on both knees up some stairs, last saw a podiatrist , no much was done, no past CSI, she does take Tylenol PRN , no relief , along with a heating pad, slight relief,  + tylenol . R>L  01/18/2023 SYED 67 year old F here for Bilateral knee follow up, pt reports improvement with last CSI since last visit. States no pain since CSI.   06/28/2023 follow up Thomas knee. Reports improvement with CSI previously, has now worn off.   03/25/2024: SYED is here today to follow up on her BILATERAL knee. states CSI wore off 3 months after a fall. wojdd like to repeat today. being treated for fracture in R foot.

## 2024-04-12 ENCOUNTER — APPOINTMENT (OUTPATIENT)
Dept: ORTHOPEDIC SURGERY | Facility: CLINIC | Age: 69
End: 2024-04-12
Payer: MEDICARE

## 2024-04-12 PROCEDURE — 99213 OFFICE O/P EST LOW 20 MIN: CPT

## 2024-04-12 PROCEDURE — 73630 X-RAY EXAM OF FOOT: CPT | Mod: RT

## 2024-04-12 NOTE — HISTORY OF PRESENT ILLNESS
[de-identified] : Date of Injury/Onset: Patient is here for evaluation of right foot pseudojones fracture. Patient reports she fell in December on stairs and had a fracture. Patient was getting evaluated for this by podiatrist and was receiving XR multiple times but notes the 3 fractures, one of them have not been helping. Patient performed CT scan at [facility]. Patient was placed in short boot but due to lack of healing was suggested surgery. Pain: At Rest: 7 With Activity: 10 Mechanism of injury: This is NOT a Work Related Injury being treated under Worker's Compensation. This is NOT an athletic injury occurring associated with an interscholastic or organized sports team. Quality of symptoms: Sharp pain Improves with: Ointment, Tylenol, Naproxen Worse with: Sharp pains will random Prior treatment: Short boot, Ace bandage Prior Imaging: CT scan from [facility] Reports Available For Review Today: Out of work/sport: Out of work School/Sport/Position/Occupation: Not working Personal goal:   pmhx:

## 2024-04-12 NOTE — DATA REVIEWED
[FreeTextEntry1] : 3 v R foot  evidence of pseudojones fx   CT R Foot pseudojones fracture with delayed healing

## 2024-04-12 NOTE — PHYSICAL EXAM
[Right] : right foot and ankle [NL (40)] : plantar flexion 40 degrees [NL 30)] : inversion 30 degrees [NL (20)] : eversion 20 degrees [5___] : eversion 5[unfilled]/5 [2+] : posterior tibialis pulse: 2+ [] : negative squeeze test at foot [TWNoteComboBox7] : dorsiflexion 0 degrees

## 2024-04-12 NOTE — DISCUSSION/SUMMARY
[de-identified] : Closed base of 5th MT fx with delayed union, asymptomatic Therapeutic levels of vitamin D ASO , Carbon fiber insert, functional ankle PT RTC 4 weeks d/w pt that in some pts it takes 5-6 months to see union on xr but I will modifiy tx based on clinical healing  next visit: repeat R foot XR

## 2024-05-13 ENCOUNTER — APPOINTMENT (OUTPATIENT)
Dept: ORTHOPEDIC SURGERY | Facility: CLINIC | Age: 69
End: 2024-05-13
Payer: MEDICARE

## 2024-05-13 PROCEDURE — 73630 X-RAY EXAM OF FOOT: CPT | Mod: RT

## 2024-05-13 PROCEDURE — 99212 OFFICE O/P EST SF 10 MIN: CPT

## 2024-05-13 NOTE — HISTORY OF PRESENT ILLNESS
[de-identified] : Date of Injury/Onset: Patient is here for evaluation of right foot pseudojones fracture. Patient reports she fell in December on stairs and had a fracture. Patient was getting evaluated for this by podiatrist and was receiving XR multiple times but notes the 3 fractures, one of them have not been helping. Patient performed CT scan at [facility]. Patient was placed in short boot but due to lack of healing was suggested surgery. Pain: At Rest: 7 With Activity: 10 Mechanism of injury: This is NOT a Work Related Injury being treated under Worker's Compensation. This is NOT an athletic injury occurring associated with an interscholastic or organized sports team. Quality of symptoms: Sharp pain Improves with: Ointment, Tylenol, Naproxen Worse with: Sharp pains will random Prior treatment: Short boot, Ace bandage Prior Imaging: CT scan from [facility] Reports Available For Review Today: Out of work/sport: Out of work School/Sport/Position/Occupation: Not working Personal goal:   pmhx:  05/13/2024 SYED 68 year F is here today to follow up on right foot, patient had been compliant with ASO only during nighttime, patient is doing PT Patient reports some mild improvement since last visit.

## 2024-05-13 NOTE — ED ADULT NURSE NOTE - NS ED NOTE  TALK SOMEONE YN
Resolved.  Noted in the hospital, secondary to dehydration.  Repeat chemistry panel a week after discharge shows normal potassium.  
Symptoms resolved, likely viral gastroenteritis.  - ER/return precautions discussed  
No

## 2024-05-13 NOTE — DATA REVIEWED
[FreeTextEntry1] : 3 v R foot  evidence of pseudojones fx   CT R Foot pseudojones fracture with delayed healing  3 v R foot 5/13 evidence of pseudojones fx , interval callous formation

## 2024-05-13 NOTE — DISCUSSION/SUMMARY
[de-identified] : Closed base of 5th MT fx with delayed union, asymptomatic Therapeutic levels of vitamin D ASO , Carbon fiber insert, functional ankle PT RTC 4 weeks d/w pt that in some pts it takes 5-6 months to see union on xr but I will modifiy tx based on clinical healing  next visit: repeat R foot XR  **** 5/13 Closed base of 5th MT fx with delayed union, asymptomatic Therapeutic levels of vitamin D ASO , Carbon fiber insert, continue functional ankle PT RTC 4 weeks d/w pt that in some pts it takes 5-6 months to see union on xr but I will modifiy tx based on clinical healing  next visit: repeat R foot XR

## 2024-06-10 ENCOUNTER — APPOINTMENT (OUTPATIENT)
Dept: ORTHOPEDIC SURGERY | Facility: CLINIC | Age: 69
End: 2024-06-10
Payer: MEDICARE

## 2024-06-10 DIAGNOSIS — S92.351S DISPLACED FRACTURE OF FIFTH METATARSAL BONE, RIGHT FOOT, SEQUELA: ICD-10-CM

## 2024-06-10 PROCEDURE — 99212 OFFICE O/P EST SF 10 MIN: CPT

## 2024-06-10 PROCEDURE — 73630 X-RAY EXAM OF FOOT: CPT | Mod: RT

## 2024-06-10 RX ORDER — ERGOCALCIFEROL 1.25 MG/1
1.25 MG CAPSULE, LIQUID FILLED ORAL
Qty: 8 | Refills: 0 | Status: ACTIVE | COMMUNITY
Start: 2024-04-12 | End: 1900-01-01

## 2024-06-10 NOTE — HISTORY OF PRESENT ILLNESS
[de-identified] : Date of Injury/Onset: Patient is here for evaluation of right foot pseudojones fracture. Patient reports she fell in December on stairs and had a fracture. Patient was getting evaluated for this by podiatrist and was receiving XR multiple times but notes the 3 fractures, one of them have not been helping. Patient performed CT scan at [facility]. Patient was placed in short boot but due to lack of healing was suggested surgery. Pain: At Rest: 7 With Activity: 10 Mechanism of injury: This is NOT a Work Related Injury being treated under Worker's Compensation. This is NOT an athletic injury occurring associated with an interscholastic or organized sports team. Quality of symptoms: Sharp pain Improves with: Ointment, Tylenol, Naproxen Worse with: Sharp pains will random Prior treatment: Short boot, Ace bandage Prior Imaging: CT scan from [facility] Reports Available For Review Today: Out of work/sport: Out of work School/Sport/Position/Occupation: Not working Personal goal:   pmhx:  05/13/2024 SYED 68 year F is here today to follow up on right foot, patient had been compliant with ASO only during nighttime, patient is doing PT Patient reports some mild improvement since last visit.  06/10/2024 SYED 68 year F is here today to follow up on right foot. Patient reports some improvement since last visit, patient is doing PT, had been compliant with ASO and is taking VITAMINS D.

## 2024-06-10 NOTE — DISCUSSION/SUMMARY
[de-identified] : Closed base of 5th MT fx with delayed union, asymptomatic Therapeutic levels of vitamin D ASO , Carbon fiber insert, functional ankle PT RTC 4 weeks d/w pt that in some pts it takes 5-6 months to see union on xr but I will modifiy tx based on clinical healing  next visit: repeat R foot XR  **** 5/13 Closed base of 5th MT fx with delayed union, asymptomatic Therapeutic levels of vitamin D ASO , Carbon fiber insert, continue functional ankle PT RTC 4 weeks d/w pt that in some pts it takes 5-6 months to see union on xr but I will modifiy tx based on clinical healing  next visit: repeat R foot XR  *** 6/10 Closed base of 5th MT fx with delayed union, asymptomatic Therapeutic levels of vitamin D ASO , Carbon fiber insert, continue functional ankle PT RTC 4 weeks d/w pt that in some pts it takes 5-6 months to see union on xr but I will modifiy tx based on clinical healing  next visit: repeat R foot XR

## 2024-06-10 NOTE — DATA REVIEWED
[FreeTextEntry1] : 3 v R foot  evidence of pseudojones fx   CT R Foot pseudojones fracture with delayed healing  3 v R foot 5/13 evidence of pseudojones fx , interval callous formation  3 v R foot 6/10 evidence of pseudojones fx , interval callous formation

## 2024-06-26 ENCOUNTER — APPOINTMENT (OUTPATIENT)
Dept: OBGYN | Facility: CLINIC | Age: 69
End: 2024-06-26
Payer: MEDICARE

## 2024-06-26 VITALS
SYSTOLIC BLOOD PRESSURE: 122 MMHG | RESPIRATION RATE: 16 BRPM | OXYGEN SATURATION: 97 % | TEMPERATURE: 97.9 F | WEIGHT: 175 LBS | DIASTOLIC BLOOD PRESSURE: 86 MMHG | HEIGHT: 64 IN | BODY MASS INDEX: 29.88 KG/M2 | HEART RATE: 58 BPM

## 2024-06-26 DIAGNOSIS — Z01.419 ENCOUNTER FOR GYNECOLOGICAL EXAMINATION (GENERAL) (ROUTINE) W/OUT ABNORMAL FINDINGS: ICD-10-CM

## 2024-06-26 PROCEDURE — G0101: CPT

## 2024-07-01 LAB — CYTOLOGY CVX/VAG DOC THIN PREP: NORMAL

## 2024-07-08 ENCOUNTER — APPOINTMENT (OUTPATIENT)
Dept: ORTHOPEDIC SURGERY | Facility: CLINIC | Age: 69
End: 2024-07-08

## 2024-07-12 ENCOUNTER — APPOINTMENT (OUTPATIENT)
Dept: ORTHOPEDIC SURGERY | Facility: CLINIC | Age: 69
End: 2024-07-12

## 2024-07-15 ENCOUNTER — APPOINTMENT (OUTPATIENT)
Dept: ORTHOPEDIC SURGERY | Facility: CLINIC | Age: 69
End: 2024-07-15
Payer: MEDICARE

## 2024-07-15 DIAGNOSIS — S92.351S DISPLACED FRACTURE OF FIFTH METATARSAL BONE, RIGHT FOOT, SEQUELA: ICD-10-CM

## 2024-07-15 PROCEDURE — 73630 X-RAY EXAM OF FOOT: CPT | Mod: RT

## 2024-07-15 PROCEDURE — 99213 OFFICE O/P EST LOW 20 MIN: CPT

## 2024-08-12 ENCOUNTER — APPOINTMENT (OUTPATIENT)
Dept: ORTHOPEDIC SURGERY | Facility: CLINIC | Age: 69
End: 2024-08-12
Payer: MEDICARE

## 2024-08-12 DIAGNOSIS — S92.351S DISPLACED FRACTURE OF FIFTH METATARSAL BONE, RIGHT FOOT, SEQUELA: ICD-10-CM

## 2024-08-12 PROCEDURE — 99212 OFFICE O/P EST SF 10 MIN: CPT

## 2024-08-12 PROCEDURE — 73630 X-RAY EXAM OF FOOT: CPT | Mod: RT

## 2024-08-12 NOTE — PHYSICAL EXAM
[Right] : right foot and ankle [NL (40)] : plantar flexion 40 degrees [NL 30)] : inversion 30 degrees [NL (20)] : eversion 20 degrees [5___] : eversion 5[unfilled]/5 [2+] : posterior tibialis pulse: 2+ [] : non-antalgic [TWNoteComboBox7] : dorsiflexion 0 degrees

## 2024-08-12 NOTE — DISCUSSION/SUMMARY
[de-identified] : Closed base of 5th MT fx with delayed union, asymptomatic Therapeutic levels of vitamin D ASO , Carbon fiber insert, functional ankle PT RTC 4 weeks d/w pt that in some pts it takes 5-6 months to see union on xr but I will modifiy tx based on clinical healing  next visit: repeat R foot XR  **** 5/13 Closed base of 5th MT fx with delayed union, asymptomatic Therapeutic levels of vitamin D ASO , Carbon fiber insert, continue functional ankle PT RTC 4 weeks d/w pt that in some pts it takes 5-6 months to see union on xr but I will modifiy tx based on clinical healing  next visit: repeat R foot XR  *** 6/10 Closed base of 5th MT fx with delayed union, asymptomatic Therapeutic levels of vitamin D ASO , Carbon fiber insert, continue functional ankle PT RTC 4 weeks d/w pt that in some pts it takes 5-6 months to see union on xr but I will modifiy tx based on clinical healing  next visit: repeat R foot XR  *** 7/15 Closed base of 5th MT fx with delayed union, asymptomatic Therapeutic levels of vitamin D ASO , Carbon fiber insert, continue functional ankle PT RTC 4 weeks d/w pt that in some pts it takes 5-6 months to see union on xr but I will modifiy tx based on clinical healing  next visit: repeat R foot XR  **** 8/12 Closed base of 5th MT fx with interval callous formation,  Therapeutic levels of vitamin D ASO , Carbon fiber insert, continue functional ankle PT RTC 4 weeks d/w pt that in some pts it takes 5-6 months to see union on xr but I will modifiy tx based on clinical healing  next visit: repeat R foot XR

## 2024-08-12 NOTE — HISTORY OF PRESENT ILLNESS
[de-identified] : Date of Injury/Onset: Patient is here for evaluation of right foot pseudojones fracture. Patient reports she fell in December on stairs and had a fracture. Patient was getting evaluated for this by podiatrist and was receiving XR multiple times but notes the 3 fractures, one of them have not been helping. Patient performed CT scan at [facility]. Patient was placed in short boot but due to lack of healing was suggested surgery. Pain: At Rest: 7 With Activity: 10 Mechanism of injury: This is NOT a Work Related Injury being treated under Worker's Compensation. This is NOT an athletic injury occurring associated with an interscholastic or organized sports team. Quality of symptoms: Sharp pain Improves with: Ointment, Tylenol, Naproxen Worse with: Sharp pains will random Prior treatment: Short boot, Ace bandage Prior Imaging: CT scan from [facility] Reports Available For Review Today: Out of work/sport: Out of work School/Sport/Position/Occupation: Not working Personal goal:   pmhx:  05/13/2024 SYED 68 year F is here today to follow up on right foot, patient had been compliant with ASO only during nighttime, patient is doing PT Patient reports some mild improvement since last visit.  06/10/2024 SYED Barrientos year F is here today to follow up on right foot. Patient reports some improvement since last visit, patient is doing PT, had been compliant with ASO and is taking VITAMINS D.  07/15/2024 SYED Barrientos year F is here today to follow up right foot. Patient reports slightly improvement with pain since last visit, patient is doing PT 2 timed a week. Patient is taking vitamins D. Patient had not been compliant with carbon fiber, states she had been compliant with ASO.   08/12/2024 SYED 68 year F is here today to follow up on right foot. Patient had been compliant with carbon fiber, ASO only to sleep. Patient had not been doing PT. Patient reports some slightly improvement since last visit, reports some intermittent sharp pain.

## 2024-09-09 ENCOUNTER — APPOINTMENT (OUTPATIENT)
Dept: ORTHOPEDIC SURGERY | Facility: CLINIC | Age: 69
End: 2024-09-09
Payer: MEDICARE

## 2024-09-09 DIAGNOSIS — S92.351S DISPLACED FRACTURE OF FIFTH METATARSAL BONE, RIGHT FOOT, SEQUELA: ICD-10-CM

## 2024-09-09 PROCEDURE — 99213 OFFICE O/P EST LOW 20 MIN: CPT

## 2024-09-09 PROCEDURE — 73630 X-RAY EXAM OF FOOT: CPT | Mod: RT

## 2024-09-09 NOTE — DATA REVIEWED
[FreeTextEntry1] : 3 v R foot  evidence of pseudojones fx   CT R Foot pseudojones fracture with delayed healing  3 v R foot 5/13 evidence of pseudojones fx , interval callous formation  3 v R foot 6/10 evidence of pseudojones fx , interval callous formation  3 v R foot 9/9 evidence of pseudojones fx , interval callous formation

## 2024-09-09 NOTE — HISTORY OF PRESENT ILLNESS
[de-identified] : Date of Injury/Onset: Patient is here for evaluation of right foot pseudojones fracture. Patient reports she fell in December on stairs and had a fracture. Patient was getting evaluated for this by podiatrist and was receiving XR multiple times but notes the 3 fractures, one of them have not been helping. Patient performed CT scan at [facility]. Patient was placed in short boot but due to lack of healing was suggested surgery. Pain: At Rest: 7 With Activity: 10 Mechanism of injury: This is NOT a Work Related Injury being treated under Worker's Compensation. This is NOT an athletic injury occurring associated with an interscholastic or organized sports team. Quality of symptoms: Sharp pain Improves with: Ointment, Tylenol, Naproxen Worse with: Sharp pains will random Prior treatment: Short boot, Ace bandage Prior Imaging: CT scan from [facility] Reports Available For Review Today: Out of work/sport: Out of work School/Sport/Position/Occupation: Not working Personal goal:   pmhx:  05/13/2024 SYED 68 year F is here today to follow up on right foot, patient had been compliant with ASO only during nighttime, patient is doing PT Patient reports some mild improvement since last visit.  [facility] to follow up on right foot. Patient reports some improvement since last visit, patient is doing PT, had been compliant with ASO and is taking VITAMINS D.  07/15/2024 SYED 68 year F is here today to follow up right foot. Patient reports slightly improvement with pain since last visit, patient is doing PT 2 timed a week. Patient is taking vitamins D. Patient had not been compliant with carbon fiber, states she had been compliant with ASO.   [facility] here today to follow up on right foot. Patient had been compliant with carbon fiber, ASO only to sleep. Patient had not been doing PT. Patient reports some slightly improvement since last visit, reports some intermittent sharp pain.   09/09/2024 [facility]AB[facility] 68 year F is here today to follow up on right foot. Patient is doing PT, had been taken vitamins D, states she had been complaint with ASO and the carbon fiber insert sometimes. No

## 2024-09-09 NOTE — DISCUSSION/SUMMARY
[de-identified] : Closed base of 5th MT fx with delayed union, asymptomatic Therapeutic levels of vitamin D ASO , Carbon fiber insert, functional ankle PT RTC 4 weeks d/w pt that in some pts it takes 5-6 months to see union on xr but I will modifiy tx based on clinical healing  next visit: repeat R foot XR  **** 5/13 Closed base of 5th MT fx with delayed union, asymptomatic Therapeutic levels of vitamin D ASO , Carbon fiber insert, continue functional ankle PT RTC 4 weeks d/w pt that in some pts it takes 5-6 months to see union on xr but I will modifiy tx based on clinical healing  next visit: repeat R foot XR  *** 6/10 Closed base of 5th MT fx with delayed union, asymptomatic Therapeutic levels of vitamin D ASO , Carbon fiber insert, continue functional ankle PT RTC 4 weeks d/w pt that in some pts it takes 5-6 months to see union on xr but I will modifiy tx based on clinical healing  next visit: repeat R foot XR  *** 7/15 Closed base of 5th MT fx with delayed union, asymptomatic Therapeutic levels of vitamin D ASO , Carbon fiber insert, continue functional ankle PT RTC 4 weeks d/w pt that in some pts it takes 5-6 months to see union on xr but I will modifiy tx based on clinical healing  next visit: repeat R foot XR  **** 8/12 Closed base of 5th MT fx with interval callous formation,  Therapeutic levels of vitamin D ASO , Carbon fiber insert, continue functional ankle PT RTC 4 weeks d/w pt that in some pts it takes 5-6 months to see union on xr but I will modifiy tx based on clinical healing  next visit: repeat R foot XR  *** 9/9 Closed base of 5th MT fx with interval callous formation, clinically healed Therapeutic levels of vitamin D ASO , Carbon fiber insert, continue functional ankle PT gradual return to sport/activities RTC 2 months if worse d/w pt that in some pts it takes 5-6 months to see union on xr but I will modifiy tx based on clinical healing  next visit: repeat R foot XR

## 2024-10-28 ENCOUNTER — APPOINTMENT (OUTPATIENT)
Dept: ORTHOPEDIC SURGERY | Facility: CLINIC | Age: 69
End: 2024-10-28
Payer: MEDICARE

## 2024-10-28 DIAGNOSIS — M17.12 UNILATERAL PRIMARY OSTEOARTHRITIS, LEFT KNEE: ICD-10-CM

## 2024-10-28 DIAGNOSIS — M17.11 UNILATERAL PRIMARY OSTEOARTHRITIS, RIGHT KNEE: ICD-10-CM

## 2024-10-28 PROCEDURE — 99213 OFFICE O/P EST LOW 20 MIN: CPT | Mod: 25

## 2024-10-28 PROCEDURE — 20610 DRAIN/INJ JOINT/BURSA W/O US: CPT | Mod: 50

## 2024-12-09 ENCOUNTER — APPOINTMENT (OUTPATIENT)
Dept: ORTHOPEDIC SURGERY | Facility: CLINIC | Age: 69
End: 2024-12-09
Payer: MEDICARE

## 2024-12-09 DIAGNOSIS — M17.12 UNILATERAL PRIMARY OSTEOARTHRITIS, LEFT KNEE: ICD-10-CM

## 2024-12-09 DIAGNOSIS — M17.11 UNILATERAL PRIMARY OSTEOARTHRITIS, RIGHT KNEE: ICD-10-CM

## 2024-12-09 PROCEDURE — 73564 X-RAY EXAM KNEE 4 OR MORE: CPT | Mod: 50

## 2024-12-09 PROCEDURE — 99214 OFFICE O/P EST MOD 30 MIN: CPT

## 2024-12-09 RX ORDER — METHYLPREDNISOLONE 4 MG/1
4 TABLET ORAL
Qty: 1 | Refills: 1 | Status: ACTIVE | COMMUNITY
Start: 2024-12-09 | End: 1900-01-01

## 2024-12-12 RX ORDER — HYALURONATE SODIUM 20 MG/2 ML
20 SYRINGE (ML) INTRAARTICULAR
Qty: 3 | Refills: 0 | Status: ACTIVE | COMMUNITY
Start: 2024-12-12 | End: 1900-01-01